# Patient Record
Sex: FEMALE | Race: WHITE | Employment: FULL TIME | URBAN - METROPOLITAN AREA
[De-identification: names, ages, dates, MRNs, and addresses within clinical notes are randomized per-mention and may not be internally consistent; named-entity substitution may affect disease eponyms.]

---

## 2019-12-09 ENCOUNTER — HOSPITAL ENCOUNTER (OUTPATIENT)
Age: 49
Discharge: HOME | End: 2019-12-11

## 2019-12-09 PROCEDURE — 86765 RUBEOLA ANTIBODY: CPT

## 2019-12-09 PROCEDURE — 86735 MUMPS ANTIBODY: CPT

## 2019-12-09 PROCEDURE — 86762 RUBELLA ANTIBODY: CPT

## 2019-12-09 PROCEDURE — 86787 VARICELLA-ZOSTER ANTIBODY: CPT

## 2019-12-10 LAB
MEASLES IMMUNE (IGG): NORMAL
MUMPS AB IGG: NORMAL
RUBELLA ANTIBODY IGG: NORMAL
VARICELLA-ZOSTER VIRUS AB, IGG: NORMAL

## 2020-12-30 ENCOUNTER — APPOINTMENT (OUTPATIENT)
Dept: GENERAL RADIOLOGY | Age: 50
End: 2020-12-30
Payer: MEDICAID

## 2020-12-30 ENCOUNTER — HOSPITAL ENCOUNTER (EMERGENCY)
Age: 50
Discharge: HOME | End: 2020-12-30
Attending: EMERGENCY MEDICINE
Payer: MEDICAID

## 2020-12-30 VITALS
HEART RATE: 86 BPM | DIASTOLIC BLOOD PRESSURE: 84 MMHG | RESPIRATION RATE: 16 BRPM | OXYGEN SATURATION: 98 % | SYSTOLIC BLOOD PRESSURE: 148 MMHG | WEIGHT: 170 LBS | BODY MASS INDEX: 26.68 KG/M2 | HEIGHT: 67 IN | TEMPERATURE: 98.3 F

## 2020-12-30 LAB
EKG ATRIAL RATE: 85 BPM
EKG P AXIS: 57 DEGREES
EKG P-R INTERVAL: 150 MS
EKG Q-T INTERVAL: 362 MS
EKG QRS DURATION: 88 MS
EKG QTC CALCULATION (BAZETT): 430 MS
EKG R AXIS: 15 DEGREES
EKG T AXIS: 15 DEGREES
EKG VENTRICULAR RATE: 85 BPM

## 2020-12-30 PROCEDURE — 99283 EMERGENCY DEPT VISIT LOW MDM: CPT

## 2020-12-30 PROCEDURE — 96372 THER/PROPH/DIAG INJ SC/IM: CPT

## 2020-12-30 PROCEDURE — 93010 ELECTROCARDIOGRAM REPORT: CPT | Performed by: INTERNAL MEDICINE

## 2020-12-30 PROCEDURE — 6360000002 HC RX W HCPCS: Performed by: EMERGENCY MEDICINE

## 2020-12-30 PROCEDURE — 73030 X-RAY EXAM OF SHOULDER: CPT

## 2020-12-30 PROCEDURE — 93005 ELECTROCARDIOGRAM TRACING: CPT | Performed by: EMERGENCY MEDICINE

## 2020-12-30 PROCEDURE — 71046 X-RAY EXAM CHEST 2 VIEWS: CPT

## 2020-12-30 RX ORDER — LISINOPRIL AND HYDROCHLOROTHIAZIDE 25; 20 MG/1; MG/1
1 TABLET ORAL DAILY
COMMUNITY

## 2020-12-30 RX ORDER — TRAMADOL HYDROCHLORIDE 50 MG/1
50 TABLET ORAL EVERY 6 HOURS PRN
Qty: 12 TABLET | Refills: 0 | Status: SHIPPED | OUTPATIENT
Start: 2020-12-30 | End: 2021-01-02

## 2020-12-30 RX ORDER — CETIRIZINE HYDROCHLORIDE 10 MG/1
10 TABLET ORAL DAILY
COMMUNITY

## 2020-12-30 RX ORDER — KETOROLAC TROMETHAMINE 30 MG/ML
60 INJECTION, SOLUTION INTRAMUSCULAR; INTRAVENOUS ONCE
Status: COMPLETED | OUTPATIENT
Start: 2020-12-30 | End: 2020-12-30

## 2020-12-30 RX ORDER — DEXAMETHASONE SODIUM PHOSPHATE 4 MG/ML
4 INJECTION, SOLUTION INTRA-ARTICULAR; INTRALESIONAL; INTRAMUSCULAR; INTRAVENOUS; SOFT TISSUE ONCE
Status: COMPLETED | OUTPATIENT
Start: 2020-12-30 | End: 2020-12-30

## 2020-12-30 RX ORDER — NAPROXEN 500 MG/1
500 TABLET ORAL 2 TIMES DAILY PRN
Qty: 60 TABLET | Refills: 0 | Status: SHIPPED | OUTPATIENT
Start: 2020-12-30

## 2020-12-30 RX ORDER — FENTANYL CITRATE 50 UG/ML
100 INJECTION, SOLUTION INTRAMUSCULAR; INTRAVENOUS ONCE
Status: COMPLETED | OUTPATIENT
Start: 2020-12-30 | End: 2020-12-30

## 2020-12-30 RX ADMIN — DEXAMETHASONE SODIUM PHOSPHATE 4 MG: 4 INJECTION, SOLUTION INTRAMUSCULAR; INTRAVENOUS at 08:28

## 2020-12-30 RX ADMIN — FENTANYL CITRATE 100 MCG: 50 INJECTION, SOLUTION INTRAMUSCULAR; INTRAVENOUS at 08:28

## 2020-12-30 RX ADMIN — KETOROLAC TROMETHAMINE 60 MG: 30 INJECTION, SOLUTION INTRAMUSCULAR at 08:28

## 2020-12-30 ASSESSMENT — PAIN DESCRIPTION - LOCATION
LOCATION: SHOULDER
LOCATION: SHOULDER

## 2020-12-30 ASSESSMENT — PAIN DESCRIPTION - FREQUENCY
FREQUENCY: CONTINUOUS
FREQUENCY: CONTINUOUS

## 2020-12-30 ASSESSMENT — PAIN DESCRIPTION - PAIN TYPE
TYPE: ACUTE PAIN
TYPE: ACUTE PAIN

## 2020-12-30 ASSESSMENT — PAIN SCALES - GENERAL
PAINLEVEL_OUTOF10: 10
PAINLEVEL_OUTOF10: 7
PAINLEVEL_OUTOF10: 10

## 2020-12-30 ASSESSMENT — PAIN DESCRIPTION - ONSET
ONSET: GRADUAL
ONSET: GRADUAL

## 2020-12-30 ASSESSMENT — PAIN DESCRIPTION - PROGRESSION
CLINICAL_PROGRESSION: GRADUALLY IMPROVING
CLINICAL_PROGRESSION: GRADUALLY WORSENING

## 2020-12-30 ASSESSMENT — PAIN DESCRIPTION - ORIENTATION
ORIENTATION: LEFT
ORIENTATION: LEFT

## 2020-12-30 ASSESSMENT — PAIN DESCRIPTION - DESCRIPTORS: DESCRIPTORS: CONSTANT;SHARP;THROBBING

## 2020-12-30 NOTE — ED PROVIDER NOTES
HPI:  12/30/20,   Time: 8:15 AM MARTHA Liu is a 45 y.o. female presenting to the ED for left shoulder pain, beginning 2 weeks ago. The complaint has been persistent, moderate in severity, and worsened by movement of left shoulder. Thinks rotator cuff, lots of repetitive over the head movements, getting worse, pcp hasn't gotten her in despite calling. No fever, no n/v/d/cp/sob/cough/congestion. No nunbness/tingling    Review of Systems:   Pertinent positives and negatives are stated within HPI, all other systems reviewed and are negative.          --------------------------------------------- PAST HISTORY ---------------------------------------------  Past Medical History:  has a past medical history of Cervical radiculopathy, Headache(784.0), Meningitis, Mononucleosis, Seizure disorder ([facility] [address] and Seizures ([facility] [address] Past Surgical History:  has a past surgical history that includes Dilation and curettage of uterus; Endometrial ablation; and Carpal tunnel release. Social History:  reports that she has been smoking. She has been smoking about 1.00 pack per day. She has never used smokeless tobacco. She reports current alcohol use of about 3.0 standard drinks of alcohol per week. She reports that she does not use drugs. Family History: family history is not on file. The patients home medications have been reviewed. Allergies: Patient has no known allergies. ---------------------------------------------------PHYSICAL EXAM--------------------------------------    Constitutional/General: Alert and oriented x3, in pain  Head: Normocephalic and atraumatic  Eyes: PERRL, EOMI, conjunctive normal, sclera non icteric  Mouth: Oropharynx clear, handling secretions,   Neck: Supple, full ROM, non tender to palpation in the midline, no stridor, no crepitus, no meningeal signs  Respiratory: Not in respiratory distress  Cardiovascular:  Regular rate. Regular rhythm. No murmurs, gallops, or rubs. 2+ distal pulses  Chest: No chest wall tenderness  GI:  Abdomen Soft, Non tender, Non distended. Musculoskeletal: limited rom left shoulder, diffuse ant ttp left shoulder. nvi distal.  strength difficult to test s due to pain lue, but does have effort with er/ir/abd/adduction of left shoulder. Warm and well perfused, no clubbing, cyanosis, or edema. Capillary refill <3 seconds  Integument: skin warm and dry. No rashes. Lymphatic: no lymphadenopathy noted  Neurologic: GCS 15, no focal deficits, symmetric strength 5/5 in the upper and lower extremities bilaterally as able to test  Psychiatric: Normal Affect    -------------------------------------------------- RESULTS -------------------------------------------------  I have personally reviewed all laboratory and imaging results for this patient. Results are listed below. LABS:  Results for orders placed or performed during the hospital encounter of 12/30/20   EKG 12 Lead   Result Value Ref Range    Ventricular Rate 85 BPM    Atrial Rate 85 BPM    P-R Interval 150 ms    QRS Duration 88 ms    Q-T Interval 362 ms    QTc Calculation (Bazett) 430 ms    P Axis 57 degrees    R Axis 15 degrees    T Axis 15 degrees       RADIOLOGY:  Interpreted by Radiologist.  XR SHOULDER LEFT (MIN 2 VIEWS)   Final Result   No acute process      XR CHEST (2 VW)   Final Result   Normal chest          EKG: This EKG is signed and interpreted by the EP. Time: 829  Rate: 85  Rhythm: Sinus  Interpretation: non-specific EKG  Comparison: None      ------------------------- NURSING NOTES AND VITALS REVIEWED ---------------------------   The nursing notes within the ED encounter and vital signs as below have been reviewed by myself.   BP (!) 190/104   Pulse 115   Temp 98.3 °F (36.8 °C) (Infrared)   Resp 18   Ht 5' 7\" (1.702 m)   Wt 170 lb (77.1 kg)   SpO2 98%   BMI 26.63 kg/m²   Oxygen Saturation Interpretation: Normal    The patients available past medical records and past encounters were reviewed. ------------------------------ ED COURSE/MEDICAL DECISION MAKING----------------------  Medications   fentaNYL (SUBLIMAZE) injection 100 mcg (100 mcg Intramuscular Given 12/30/20 0828)   ketorolac (TORADOL) injection 60 mg (60 mg Intramuscular Given 12/30/20 0828)   dexamethasone (DECADRON) injection 4 mg (4 mg Intramuscular Given 12/30/20 0828)         ED COURSE:       Medical Decision Making:    Sx msk, nvi distal, pain controlled in ed, dc with tigre lam      This patient's ED course included: a personal history and physicial examination    This patient has remained hemodynamically stable during their ED course. Re-Evaluations:             Re-evaluation. Patients symptoms are improving    Re-examination  12/30/20   9:15 AM EST                       Counseling: The emergency provider has spoken with the patient and discussed todays results, in addition to providing specific details for the plan of care and counseling regarding the diagnosis and prognosis. Questions are answered at this time and they are agreeable with the plan.       --------------------------------- IMPRESSION AND DISPOSITION ---------------------------------    IMPRESSION  1. Acute pain of left shoulder        DISPOSITION  Disposition: Discharge to home  Patient condition is stable    NOTE: This report was transcribed using voice recognition software.  Every effort was made to ensure accuracy; however, inadvertent computerized transcription errors may be present        Blaine Kanner, MD  12/30/20 [id number]

## 2020-12-30 NOTE — LETTER
[facility] Emergency Department  [id number] [address]  Phone: [phone]             December 30, 2020    Patient: Veronica Curran   YOB: 1970   Date of Visit: 12/30/2020       To Whom It May Concern:    Gregoria Garcia was seen and treated in our emergency department on 12/30/2020. She may return to work on 01/05/2021, any further excuse per pcp.       Sincerely,             Signature:__________________________________

## 2020-12-30 NOTE — ED NOTES
Pt not driving friend is. Pt states she is feeling much better than when she came in, ambulatory without any difficulty, denies any dizziness.      Anderson Bryant RN  12/30/20 8581

## 2024-02-18 ENCOUNTER — HOSPITAL ENCOUNTER (EMERGENCY)
Age: 54
Discharge: HOME | End: 2024-02-18
Attending: EMERGENCY MEDICINE
Payer: MEDICAID

## 2024-02-18 ENCOUNTER — APPOINTMENT (OUTPATIENT)
Dept: CT IMAGING | Age: 54
End: 2024-02-18
Payer: MEDICAID

## 2024-02-18 VITALS
WEIGHT: 180 LBS | DIASTOLIC BLOOD PRESSURE: 83 MMHG | RESPIRATION RATE: 16 BRPM | SYSTOLIC BLOOD PRESSURE: 124 MMHG | HEART RATE: 80 BPM | BODY MASS INDEX: 28.19 KG/M2 | OXYGEN SATURATION: 98 % | TEMPERATURE: 97.7 F

## 2024-02-18 DIAGNOSIS — H70.91 MASTOIDITIS OF RIGHT SIDE: Primary | ICD-10-CM

## 2024-02-18 DIAGNOSIS — H66.001 NON-RECURRENT ACUTE SUPPURATIVE OTITIS MEDIA OF RIGHT EAR WITHOUT SPONTANEOUS RUPTURE OF TYMPANIC MEMBRANE: ICD-10-CM

## 2024-02-18 DIAGNOSIS — H60.391 INFECTIVE OTITIS EXTERNA OF RIGHT EAR: ICD-10-CM

## 2024-02-18 DIAGNOSIS — I72.5 BASILAR ARTERY ANEURYSM (HCC): ICD-10-CM

## 2024-02-18 LAB
ANION GAP SERPL CALCULATED.3IONS-SCNC: 11 MMOL/L (ref 7–16)
BASOPHILS # BLD: 0.04 K/UL (ref 0–0.2)
BASOPHILS NFR BLD: 1 % (ref 0–2)
BUN SERPL-MCNC: 11 MG/DL (ref 6–20)
CALCIUM SERPL-MCNC: 10.3 MG/DL (ref 8.6–10.2)
CHLORIDE SERPL-SCNC: 90 MMOL/L (ref 98–107)
CO2 SERPL-SCNC: 27 MMOL/L (ref 22–29)
CREAT SERPL-MCNC: 0.7 MG/DL (ref 0.5–1)
EOSINOPHIL # BLD: 0.09 K/UL (ref 0.05–0.5)
EOSINOPHILS RELATIVE PERCENT: 1 % (ref 0–6)
ERYTHROCYTE [DISTWIDTH] IN BLOOD BY AUTOMATED COUNT: 11.4 % (ref 11.5–15)
GFR SERPL CREATININE-BSD FRML MDRD: >60 ML/MIN/1.73M2
GLUCOSE SERPL-MCNC: 103 MG/DL (ref 74–99)
HCT VFR BLD AUTO: 38.5 % (ref 34–48)
HGB BLD-MCNC: 13.8 G/DL (ref 11.5–15.5)
IMM GRANULOCYTES # BLD AUTO: <0.03 K/UL (ref 0–0.58)
IMM GRANULOCYTES NFR BLD: 0 % (ref 0–5)
LACTATE BLDV-SCNC: 1.2 MMOL/L (ref 0.5–1.9)
LYMPHOCYTES NFR BLD: 1.14 K/UL (ref 1.5–4)
LYMPHOCYTES RELATIVE PERCENT: 16 % (ref 20–42)
MCH RBC QN AUTO: 32.9 PG (ref 26–35)
MCHC RBC AUTO-ENTMCNC: 35.8 G/DL (ref 32–34.5)
MCV RBC AUTO: 91.7 FL (ref 80–99.9)
MONOCYTES NFR BLD: 0.5 K/UL (ref 0.1–0.95)
MONOCYTES NFR BLD: 7 % (ref 2–12)
NEUTROPHILS NFR BLD: 76 % (ref 43–80)
NEUTS SEG NFR BLD: 5.52 K/UL (ref 1.8–7.3)
PLATELET # BLD AUTO: 319 K/UL (ref 130–450)
PMV BLD AUTO: 8.5 FL (ref 7–12)
POTASSIUM SERPL-SCNC: 4.4 MMOL/L (ref 3.5–5)
RBC # BLD AUTO: 4.2 M/UL (ref 3.5–5.5)
SODIUM SERPL-SCNC: 128 MMOL/L (ref 132–146)
WBC OTHER # BLD: 7.3 K/UL (ref 4.5–11.5)

## 2024-02-18 PROCEDURE — 96366 THER/PROPH/DIAG IV INF ADDON: CPT

## 2024-02-18 PROCEDURE — 96375 TX/PRO/DX INJ NEW DRUG ADDON: CPT

## 2024-02-18 PROCEDURE — 96376 TX/PRO/DX INJ SAME DRUG ADON: CPT

## 2024-02-18 PROCEDURE — 80048 BASIC METABOLIC PNL TOTAL CA: CPT

## 2024-02-18 PROCEDURE — 85025 COMPLETE CBC W/AUTO DIFF WBC: CPT

## 2024-02-18 PROCEDURE — 99285 EMERGENCY DEPT VISIT HI MDM: CPT

## 2024-02-18 PROCEDURE — 96367 TX/PROPH/DG ADDL SEQ IV INF: CPT

## 2024-02-18 PROCEDURE — 6360000002 HC RX W HCPCS: Performed by: EMERGENCY MEDICINE

## 2024-02-18 PROCEDURE — 83605 ASSAY OF LACTIC ACID: CPT

## 2024-02-18 PROCEDURE — 2580000003 HC RX 258: Performed by: EMERGENCY MEDICINE

## 2024-02-18 PROCEDURE — 96365 THER/PROPH/DIAG IV INF INIT: CPT

## 2024-02-18 PROCEDURE — 70481 CT ORBIT/EAR/FOSSA W/DYE: CPT

## 2024-02-18 PROCEDURE — 87040 BLOOD CULTURE FOR BACTERIA: CPT

## 2024-02-18 PROCEDURE — 6360000004 HC RX CONTRAST MEDICATION: Performed by: RADIOLOGY

## 2024-02-18 RX ORDER — METHYLPREDNISOLONE 4 MG/1
TABLET ORAL
Qty: 1 KIT | Refills: 0 | Status: SHIPPED | OUTPATIENT
Start: 2024-02-18 | End: 2024-02-24

## 2024-02-18 RX ORDER — KETOROLAC TROMETHAMINE 30 MG/ML
30 INJECTION, SOLUTION INTRAMUSCULAR; INTRAVENOUS ONCE
Status: COMPLETED | OUTPATIENT
Start: 2024-02-18 | End: 2024-02-18

## 2024-02-18 RX ORDER — AMOXICILLIN AND CLAVULANATE POTASSIUM 875; 125 MG/1; MG/1
1 TABLET, FILM COATED ORAL 2 TIMES DAILY
Qty: 20 TABLET | Refills: 0 | Status: SHIPPED | OUTPATIENT
Start: 2024-02-18 | End: 2024-02-28

## 2024-02-18 RX ORDER — 0.9 % SODIUM CHLORIDE 0.9 %
1000 INTRAVENOUS SOLUTION INTRAVENOUS ONCE
Status: COMPLETED | OUTPATIENT
Start: 2024-02-18 | End: 2024-02-18

## 2024-02-18 RX ORDER — CIPROFLOXACIN AND DEXAMETHASONE 3; 1 MG/ML; MG/ML
3 SUSPENSION/ DROPS AURICULAR (OTIC) 3 TIMES DAILY
Qty: 7.5 ML | Refills: 0 | Status: SHIPPED | OUTPATIENT
Start: 2024-02-18 | End: 2024-02-28

## 2024-02-18 RX ORDER — HYDROCODONE BITARTRATE AND ACETAMINOPHEN 5; 325 MG/1; MG/1
1 TABLET ORAL EVERY 4 HOURS PRN
Qty: 18 TABLET | Refills: 0 | Status: SHIPPED | OUTPATIENT
Start: 2024-02-18 | End: 2024-02-21

## 2024-02-18 RX ADMIN — CEFEPIME 2000 MG: 2 INJECTION, POWDER, FOR SOLUTION INTRAVENOUS at 11:48

## 2024-02-18 RX ADMIN — KETOROLAC TROMETHAMINE 30 MG: 30 INJECTION, SOLUTION INTRAMUSCULAR; INTRAVENOUS at 13:59

## 2024-02-18 RX ADMIN — IOPAMIDOL 75 ML: 755 INJECTION, SOLUTION INTRAVENOUS at 09:03

## 2024-02-18 RX ADMIN — KETOROLAC TROMETHAMINE 30 MG: 30 INJECTION, SOLUTION INTRAMUSCULAR; INTRAVENOUS at 08:06

## 2024-02-18 RX ADMIN — SODIUM CHLORIDE 1000 ML: 9 INJECTION, SOLUTION INTRAVENOUS at 08:06

## 2024-02-18 RX ADMIN — VANCOMYCIN HYDROCHLORIDE 2000 MG: 1 INJECTION, POWDER, LYOPHILIZED, FOR SOLUTION INTRAVENOUS at 13:42

## 2024-02-18 ASSESSMENT — PAIN DESCRIPTION - LOCATION: LOCATION: EAR

## 2024-02-18 ASSESSMENT — PAIN - FUNCTIONAL ASSESSMENT
PAIN_FUNCTIONAL_ASSESSMENT: 0-10
PAIN_FUNCTIONAL_ASSESSMENT: 0-10

## 2024-02-18 ASSESSMENT — PAIN SCALES - GENERAL
PAINLEVEL_OUTOF10: 10
PAINLEVEL_OUTOF10: 7

## 2024-02-18 ASSESSMENT — LIFESTYLE VARIABLES
HOW OFTEN DO YOU HAVE A DRINK CONTAINING ALCOHOL: NEVER
HOW MANY STANDARD DRINKS CONTAINING ALCOHOL DO YOU HAVE ON A TYPICAL DAY: PATIENT DOES NOT DRINK

## 2024-02-18 ASSESSMENT — PAIN DESCRIPTION - ORIENTATION: ORIENTATION: RIGHT

## 2024-02-18 ASSESSMENT — PAIN DESCRIPTION - DESCRIPTORS: DESCRIPTORS: ACHING;SORE;DISCOMFORT

## 2024-02-18 NOTE — DISCHARGE INSTR - COC
Certification: I certify the above information and transfer of Kristine AVENDAÑO  is necessary for the continuing treatment of the diagnosis listed and that she requires {Admit to Appropriate Level of Care:99294} for {GREATER/LESS:277898171} 30 days.     Update Admission H&P: {CHP DME Changes in HandP:[id number]}    PHYSICIAN SIGNATURE:  {Esignature:[id number]}

## 2024-02-18 NOTE — ED PROVIDER NOTES
IntraVENous Given 2/18/24 0903)   ceFEPIme (MAXIPIME) 2,000 mg in sodium chloride 0.9 % 100 mL IVPB (Timz6Ahv) (0 mg IntraVENous Stopped 2/18/24 1327)   ketorolac (TORADOL) injection 30 mg (30 mg IntraVENous Given 2/18/24 1359)         Discussions with other health professionals:   ED Course as of 02/18/24 1500   Sun Feb 18, 2024   0916 The patient is reevaluated states she has had improvement in pain. [MT]   1136 After multiple discussions the patient is now agreeable to go to [facility].  She does not want to be admitted in our system [MT]   1232 Explained to patient that she is unable to go to [facility] secondary to no ENT coverage.  The patient is now much more agreeable to go to our facility.  States it was secondary to her insurance mostly covering at [facility].  She is agreeable to be admitted at [facility]. [MT]   1324 Access center on-call did attempt to connect to admitting physician.  We are connected to [email or web address].  At this time calls out for ENT [MT]   1338 Spoke with Dr. Amaro for ENT.  He states that patient can continue the Ciprodex drops 3 drops 3 times daily as well as be started on Augmentin and a Medrol Dosepak.  He states with no periosteal abscess patient can be discharged and follow with ENT.  I did discuss the patient's basilar artery aneurysm with Dr. Meza he states patient can follow-up outpatient. [MT]   1348 The patient is in the bed results of today discussed.  The patient will be discharged she will follow-up outpatient. [MT]      ED Course User Index  [MT] Monica Rowland DO         Pt will be d/c and will follow up with her PCP,  ENT and neuro interventional . She is educated on signs and symptoms that require emergent evaluation. Pt is advised to return to the ED if her symptoms change or worsen. If her pain persists, pt may need further evaluation. Pt is agreeable to plan and all questions have been answered at this time.      PATIENT REFERRED TO:  Mazin Lopez MD  [phone]

## 2024-02-19 ENCOUNTER — TELEPHONE (OUTPATIENT)
Dept: NEUROSURGERY | Age: 54
End: 2024-02-19

## 2024-02-19 NOTE — TELEPHONE ENCOUNTER
Sent a E mail per our protocol to Jeimy Flores [email or web address] to get pt scheduled for a ED follow up. [facility] 02/18 - Mastoiditis of right side-   Basilar artery aneurysm (HCC),   Non-recurrent acute suppurative otitis media of right ear without spontaneous rupture of tympanic membrane  Infective otitis externa of right ear. She has already called the ENT.    Basilar artery aneurysm (HCC)  Non-recurrent acute suppurative otitis media of right ear without spontaneous rupture of tympanic membrane  Infective otitis externa of right ear.   Email sent for Jeimy to schedule.

## 2024-02-23 LAB
MICROORGANISM SPEC CULT: NORMAL
MICROORGANISM SPEC CULT: NORMAL
SERVICE CMNT-IMP: NORMAL
SERVICE CMNT-IMP: NORMAL
SPECIMEN DESCRIPTION: NORMAL
SPECIMEN DESCRIPTION: NORMAL

## 2024-02-27 PROBLEM — I67.1 CEREBRAL ANEURYSM: Status: ACTIVE | Noted: 2024-02-27

## 2024-02-27 PROBLEM — Z71.6 ENCOUNTER FOR SMOKING CESSATION COUNSELING: Status: ACTIVE | Noted: 2024-02-27

## 2024-02-27 PROBLEM — F17.211 CIGARETTE NICOTINE DEPENDENCE IN REMISSION: Status: ACTIVE | Noted: 2024-02-27

## 2024-02-27 PROBLEM — I10 PRIMARY HYPERTENSION: Status: ACTIVE | Noted: 2024-02-27

## 2024-02-27 NOTE — H&P (VIEW-ONLY)
Neuro endovascular Surgery Consultation          Kristine Roy is a 54 y.o. Woman who is here for evaluation and management of Large basilar aneurysm    She is referred by Monica ROWLAND from the EMERGENCY ROOM to me to provide services as a Neuro endovascular specialist      Past Medical History:     Past Medical History:   Diagnosis Date    Cervical radiculopathy     Headache(784.0)     Meningitis     Mononucleosis     Seizure disorder (HCC)     Seizures (HCC)        Past Surgical History:     Past Surgical History:   Procedure Laterality Date    CARPAL TUNNEL RELEASE      bilateral sept 2020    DILATION AND CURETTAGE OF UTERUS      ENDOMETRIAL ABLATION         Allergies:     Patient has no known allergies.    Medications:     Prior to Admission medications    Medication Sig Start Date End Date Taking? Authorizing Provider   ciprofloxacin-dexAMETHasone (CIPRODEX) 0.3-0.1 % otic suspension Place 3 drops into the right ear in the morning, at noon, and at bedtime for 10 days 2/18/24 2/28/24  Monica Rowland DO   amoxicillin-clavulanate (AUGMENTIN) 875-125 MG per tablet Take 1 tablet by mouth 2 times daily for 10 days 2/18/24 2/28/24  Monica Rowland DO   VITAMIN D PO Take by mouth  Patient not taking: Reported on 2/18/2024    Patience Pugh MD   lisinopril-hydroCHLOROthiazide (PRINZIDE;ZESTORETIC) 20-25 MG per tablet Take 1 tablet by mouth daily    Patience Pugh MD   cetirizine (ZYRTEC) 10 MG tablet Take 10 mg by mouth daily    Patience Pugh MD   naproxen (NAPROSYN) 500 MG tablet Take 1 tablet by mouth 2 times daily as needed for Pain  Patient not taking: Reported on 2/18/2024 12/30/20   Miguel Zhu MD   DICLOFENAC PO Take 50 mg by mouth 2 times daily  12/30/20  Patience Pugh MD   gabapentin (NEURONTIN) 100 MG capsule Take 100 mg by mouth 3 times daily.      Patience Pugh MD       Social History:     Social History     Tobacco Use    Smoking status: Every Day     Current  Results   Component Value Date/Time     02/18/2024 08:00 AM    K 4.4 02/18/2024 08:00 AM    CL 90 02/18/2024 08:00 AM    CO2 27 02/18/2024 08:00 AM    BUN 11 02/18/2024 08:00 AM    CREATININE 0.7 02/18/2024 08:00 AM    LABGLOM >60 02/18/2024 08:00 AM    GLUCOSE 103 02/18/2024 08:00 AM    GLUCOSE 116 02/26/2012 11:15 PM    PROT 7.2 02/26/2012 11:15 PM    LABALBU 4.7 02/26/2012 11:15 PM    CALCIUM 10.3 02/18/2024 08:00 AM    BILITOT 0.2 02/26/2012 11:15 PM    ALKPHOS 41 02/26/2012 11:15 PM    AST 26 02/26/2012 11:15 PM    ALT 14 02/26/2012 11:15 PM     BMP:    Lab Results   Component Value Date/Time     02/18/2024 08:00 AM    K 4.4 02/18/2024 08:00 AM    CL 90 02/18/2024 08:00 AM    CO2 27 02/18/2024 08:00 AM    BUN 11 02/18/2024 08:00 AM    LABALBU 4.7 02/26/2012 11:15 PM    CREATININE 0.7 02/18/2024 08:00 AM    CALCIUM 10.3 02/18/2024 08:00 AM    LABGLOM >60 02/18/2024 08:00 AM    GLUCOSE 103 02/18/2024 08:00 AM    GLUCOSE 116 02/26/2012 11:15 PM     Sodium:    Lab Results   Component Value Date/Time     02/18/2024 08:00 AM     Potassium:    Lab Results   Component Value Date/Time    K 4.4 02/18/2024 08:00 AM       I independently reviewed the labs and imaging studies today.     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    Cerebral aneurysm Yes    Primary hypertension     Encounter for smoking cessation counseling     Cigarette nicotine dependence in remission            Plan:     We discussed the need to stop smoking immediately. I enforced that smoking is highly associated with atherosclerotic disease associated with heart attacks, strokes, and peripheral vascular disease. It is also associated with the formation and regrowth of intracranial aneurysms. I encouraged the patient to seek the help of his PCP

## 2024-03-01 ENCOUNTER — TELEPHONE (OUTPATIENT)
Dept: CASE MANAGEMENT | Age: 54
End: 2024-03-01

## 2024-03-01 NOTE — TELEPHONE ENCOUNTER
I called the patient's insurance [employer] and I spoke with Elliot MCPHERSON authorization rep., she stated no authorization is required for CPT code 17995.   I notified Enrique MCKEON ([employer]).        Electronically signed by Jose Amor on 3/1/24 at 8:46 AM EST

## 2024-03-13 ENCOUNTER — ANESTHESIA EVENT (OUTPATIENT)
Dept: INTERVENTIONAL RADIOLOGY/VASCULAR | Age: 54
End: 2024-03-13
Payer: MEDICAID

## 2024-03-13 ENCOUNTER — HOSPITAL ENCOUNTER (INPATIENT)
Dept: INTERVENTIONAL RADIOLOGY/VASCULAR | Age: 54
LOS: 1 days | Discharge: HOME | DRG: 030 | End: 2024-03-14
Attending: PSYCHIATRY & NEUROLOGY | Admitting: PSYCHIATRY & NEUROLOGY
Payer: MEDICAID

## 2024-03-13 ENCOUNTER — ANESTHESIA (OUTPATIENT)
Dept: INTERVENTIONAL RADIOLOGY/VASCULAR | Age: 54
End: 2024-03-13
Payer: MEDICAID

## 2024-03-13 DIAGNOSIS — I67.1 CEREBRAL ANEURYSM: ICD-10-CM

## 2024-03-13 LAB
ACTIVATED CLOTTING TIME, LOW RANGE: 136 SEC
ACTIVATED CLOTTING TIME, LOW RANGE: 162 SEC
ACTIVATED CLOTTING TIME, LOW RANGE: 203 SEC
ACTIVATED CLOTTING TIME, LOW RANGE: 214 SEC
ALBUMIN SERPL-MCNC: 4.3 G/DL (ref 3.5–5.2)
ALP SERPL-CCNC: 65 U/L (ref 35–104)
ALT SERPL-CCNC: 7 U/L (ref 0–32)
ANION GAP SERPL CALCULATED.3IONS-SCNC: 11 MMOL/L (ref 7–16)
AST SERPL-CCNC: 16 U/L (ref 0–31)
BASOPHILS # BLD: 0.02 K/UL (ref 0–0.2)
BASOPHILS NFR BLD: 1 % (ref 0–2)
BILIRUB SERPL-MCNC: 0.2 MG/DL (ref 0–1.2)
BUN SERPL-MCNC: 10 MG/DL (ref 6–20)
CALCIUM SERPL-MCNC: 9.9 MG/DL (ref 8.6–10.2)
CHLORIDE SERPL-SCNC: 93 MMOL/L (ref 98–107)
CLOSURE TME COLL+ADP BLD: 134 SEC (ref 48–103)
CO2 SERPL-SCNC: 26 MMOL/L (ref 22–29)
COLLAGEN EPINEPHRINE TIME: 181 SEC (ref 83–176)
CREAT SERPL-MCNC: 0.7 MG/DL (ref 0.5–1)
EOSINOPHIL # BLD: 0.19 K/UL (ref 0.05–0.5)
EOSINOPHILS RELATIVE PERCENT: 4 % (ref 0–6)
ERYTHROCYTE [DISTWIDTH] IN BLOOD BY AUTOMATED COUNT: 11.3 % (ref 11.5–15)
GFR SERPL CREATININE-BSD FRML MDRD: >60 ML/MIN/1.73M2
GLUCOSE SERPL-MCNC: 74 MG/DL (ref 74–99)
HCT VFR BLD AUTO: 36.4 % (ref 34–48)
HGB BLD-MCNC: 12.7 G/DL (ref 11.5–15.5)
IMM GRANULOCYTES # BLD AUTO: <0.03 K/UL (ref 0–0.58)
IMM GRANULOCYTES NFR BLD: 0 % (ref 0–5)
INR PPP: 1
LYMPHOCYTES NFR BLD: 1.35 K/UL (ref 1.5–4)
LYMPHOCYTES RELATIVE PERCENT: 31 % (ref 20–42)
MCH RBC QN AUTO: 32.6 PG (ref 26–35)
MCHC RBC AUTO-ENTMCNC: 34.9 G/DL (ref 32–34.5)
MCV RBC AUTO: 93.6 FL (ref 80–99.9)
MONOCYTES NFR BLD: 0.36 K/UL (ref 0.1–0.95)
MONOCYTES NFR BLD: 8 % (ref 2–12)
NEUTROPHILS NFR BLD: 56 % (ref 43–80)
NEUTS SEG NFR BLD: 2.46 K/UL (ref 1.8–7.3)
PLATELET # BLD AUTO: 316 K/UL (ref 130–450)
PMV BLD AUTO: 8.6 FL (ref 7–12)
POTASSIUM SERPL-SCNC: 4.1 MMOL/L (ref 3.5–5)
PROT SERPL-MCNC: 7.8 G/DL (ref 6.4–8.3)
PROTHROMBIN TIME: 10.9 SEC (ref 9.3–12.4)
RBC # BLD AUTO: 3.89 M/UL (ref 3.5–5.5)
SODIUM SERPL-SCNC: 130 MMOL/L (ref 132–146)
WBC OTHER # BLD: 4.4 K/UL (ref 4.5–11.5)

## 2024-03-13 PROCEDURE — C1889 IMPLANT/INSERT DEVICE, NOC: HCPCS

## 2024-03-13 PROCEDURE — 6360000002 HC RX W HCPCS: Performed by: PSYCHIATRY & NEUROLOGY

## 2024-03-13 PROCEDURE — 2709999900 IR TRANSCATH EMBOLIZATION

## 2024-03-13 PROCEDURE — 6360000002 HC RX W HCPCS: Performed by: NURSE ANESTHETIST, CERTIFIED REGISTERED

## 2024-03-13 PROCEDURE — 03LG3DZ OCCLUSION OF INTRACRANIAL ARTERY WITH INTRALUMINAL DEVICE, PERCUTANEOUS APPROACH: ICD-10-PCS | Performed by: PSYCHIATRY & NEUROLOGY

## 2024-03-13 PROCEDURE — B3171ZZ FLUOROSCOPY OF LEFT INTERNAL CAROTID ARTERY USING LOW OSMOLAR CONTRAST: ICD-10-PCS | Performed by: PSYCHIATRY & NEUROLOGY

## 2024-03-13 PROCEDURE — 2500000003 HC RX 250 WO HCPCS: Performed by: PSYCHIATRY & NEUROLOGY

## 2024-03-13 PROCEDURE — 03HY32Z INSERTION OF MONITORING DEVICE INTO UPPER ARTERY, PERCUTANEOUS APPROACH: ICD-10-PCS | Performed by: PSYCHIATRY & NEUROLOGY

## 2024-03-13 PROCEDURE — 2580000003 HC RX 258: Performed by: NURSE ANESTHETIST, CERTIFIED REGISTERED

## 2024-03-13 PROCEDURE — 61624 TCAT PERM OCCLS/EMBOLJ CNS: CPT | Performed by: PSYCHIATRY & NEUROLOGY

## 2024-03-13 PROCEDURE — 85347 COAGULATION TIME ACTIVATED: CPT

## 2024-03-13 PROCEDURE — 2580000003 HC RX 258: Performed by: PSYCHIATRY & NEUROLOGY

## 2024-03-13 PROCEDURE — 36228 PLACE CATH INTRACRANIAL ART: CPT | Performed by: PSYCHIATRY & NEUROLOGY

## 2024-03-13 PROCEDURE — 36226 PLACE CATH VERTEBRAL ART: CPT | Performed by: PSYCHIATRY & NEUROLOGY

## 2024-03-13 PROCEDURE — 6370000000 HC RX 637 (ALT 250 FOR IP): Performed by: PSYCHIATRY & NEUROLOGY

## 2024-03-13 PROCEDURE — 75898 FOLLOW-UP ANGIOGRAPHY: CPT

## 2024-03-13 PROCEDURE — 85025 COMPLETE CBC W/AUTO DIFF WBC: CPT

## 2024-03-13 PROCEDURE — 36226 PLACE CATH VERTEBRAL ART: CPT

## 2024-03-13 PROCEDURE — 2000000000 HC ICU R&B

## 2024-03-13 PROCEDURE — 85576 BLOOD PLATELET AGGREGATION: CPT

## 2024-03-13 PROCEDURE — 3700000001 HC ADD 15 MINUTES (ANESTHESIA)

## 2024-03-13 PROCEDURE — 75894 X-RAYS TRANSCATH THERAPY: CPT

## 2024-03-13 PROCEDURE — 61626 TCAT PERM OCCLS/EMBOL NONCNS: CPT

## 2024-03-13 PROCEDURE — 80053 COMPREHEN METABOLIC PANEL: CPT

## 2024-03-13 PROCEDURE — 6360000004 HC RX CONTRAST MEDICATION: Performed by: PSYCHIATRY & NEUROLOGY

## 2024-03-13 PROCEDURE — 75898 FOLLOW-UP ANGIOGRAPHY: CPT | Performed by: PSYCHIATRY & NEUROLOGY

## 2024-03-13 PROCEDURE — 85610 PROTHROMBIN TIME: CPT

## 2024-03-13 PROCEDURE — 2500000003 HC RX 250 WO HCPCS: Performed by: NURSE ANESTHETIST, CERTIFIED REGISTERED

## 2024-03-13 PROCEDURE — 36228 PLACE CATH INTRACRANIAL ART: CPT

## 2024-03-13 PROCEDURE — 3700000000 HC ANESTHESIA ATTENDED CARE

## 2024-03-13 PROCEDURE — 75894 X-RAYS TRANSCATH THERAPY: CPT | Performed by: PSYCHIATRY & NEUROLOGY

## 2024-03-13 RX ORDER — ZOLPIDEM TARTRATE 5 MG/1
5 TABLET ORAL NIGHTLY PRN
Status: COMPLETED | OUTPATIENT
Start: 2024-03-13 | End: 2024-03-13

## 2024-03-13 RX ORDER — FOLIC ACID 1 MG/1
1000 TABLET ORAL DAILY
Status: DISCONTINUED | OUTPATIENT
Start: 2024-03-13 | End: 2024-03-14 | Stop reason: HOSPADM

## 2024-03-13 RX ORDER — ATORVASTATIN CALCIUM 20 MG/1
20 TABLET, FILM COATED ORAL NIGHTLY
Status: DISCONTINUED | OUTPATIENT
Start: 2024-03-13 | End: 2024-03-14 | Stop reason: HOSPADM

## 2024-03-13 RX ORDER — LISINOPRIL AND HYDROCHLOROTHIAZIDE 25; 20 MG/1; MG/1
1 TABLET ORAL DAILY
Status: DISCONTINUED | OUTPATIENT
Start: 2024-03-13 | End: 2024-03-13 | Stop reason: SDUPTHER

## 2024-03-13 RX ORDER — SODIUM CHLORIDE 0.9 % (FLUSH) 0.9 %
5-40 SYRINGE (ML) INJECTION PRN
Status: DISCONTINUED | OUTPATIENT
Start: 2024-03-13 | End: 2024-03-14 | Stop reason: HOSPADM

## 2024-03-13 RX ORDER — ONDANSETRON 2 MG/ML
4 INJECTION INTRAMUSCULAR; INTRAVENOUS EVERY 6 HOURS PRN
Status: DISCONTINUED | OUTPATIENT
Start: 2024-03-13 | End: 2024-03-14 | Stop reason: HOSPADM

## 2024-03-13 RX ORDER — OXYCODONE HYDROCHLORIDE 5 MG/1
5 TABLET ORAL EVERY 4 HOURS PRN
Status: COMPLETED | OUTPATIENT
Start: 2024-03-13 | End: 2024-03-13

## 2024-03-13 RX ORDER — DEXAMETHASONE SODIUM PHOSPHATE 10 MG/ML
4 INJECTION INTRAMUSCULAR; INTRAVENOUS EVERY 6 HOURS PRN
Status: DISCONTINUED | OUTPATIENT
Start: 2024-03-13 | End: 2024-03-14 | Stop reason: HOSPADM

## 2024-03-13 RX ORDER — ENOXAPARIN SODIUM 100 MG/ML
40 INJECTION SUBCUTANEOUS DAILY
Status: DISCONTINUED | OUTPATIENT
Start: 2024-03-13 | End: 2024-03-14 | Stop reason: HOSPADM

## 2024-03-13 RX ORDER — MELOXICAM 7.5 MG/1
15 TABLET ORAL DAILY
Status: DISCONTINUED | OUTPATIENT
Start: 2024-03-13 | End: 2024-03-14 | Stop reason: HOSPADM

## 2024-03-13 RX ORDER — SODIUM CHLORIDE 9 MG/ML
INJECTION, SOLUTION INTRAVENOUS CONTINUOUS PRN
Status: DISCONTINUED | OUTPATIENT
Start: 2024-03-13 | End: 2024-03-13 | Stop reason: SDUPTHER

## 2024-03-13 RX ORDER — LABETALOL HYDROCHLORIDE 5 MG/ML
5 INJECTION, SOLUTION INTRAVENOUS EVERY 10 MIN PRN
Status: DISCONTINUED | OUTPATIENT
Start: 2024-03-13 | End: 2024-03-14 | Stop reason: HOSPADM

## 2024-03-13 RX ORDER — ASPIRIN 81 MG/1
81 TABLET, CHEWABLE ORAL DAILY
Status: DISCONTINUED | OUTPATIENT
Start: 2024-03-13 | End: 2024-03-14 | Stop reason: HOSPADM

## 2024-03-13 RX ORDER — SODIUM CHLORIDE 9 MG/ML
INJECTION, SOLUTION INTRAVENOUS PRN
Status: DISCONTINUED | OUTPATIENT
Start: 2024-03-13 | End: 2024-03-14 | Stop reason: HOSPADM

## 2024-03-13 RX ORDER — HEPARIN SODIUM 1000 [USP'U]/ML
INJECTION, SOLUTION INTRAVENOUS; SUBCUTANEOUS PRN
Status: DISCONTINUED | OUTPATIENT
Start: 2024-03-13 | End: 2024-03-13 | Stop reason: SDUPTHER

## 2024-03-13 RX ORDER — HYDROCHLOROTHIAZIDE 25 MG/1
12.5 TABLET ORAL DAILY
Status: DISCONTINUED | OUTPATIENT
Start: 2024-03-13 | End: 2024-03-14 | Stop reason: HOSPADM

## 2024-03-13 RX ORDER — ONDANSETRON 2 MG/ML
INJECTION INTRAMUSCULAR; INTRAVENOUS PRN
Status: DISCONTINUED | OUTPATIENT
Start: 2024-03-13 | End: 2024-03-13 | Stop reason: SDUPTHER

## 2024-03-13 RX ORDER — CLINDAMYCIN PHOSPHATE 150 MG/ML
INJECTION, SOLUTION INTRAVENOUS PRN
Status: DISCONTINUED | OUTPATIENT
Start: 2024-03-13 | End: 2024-03-13 | Stop reason: SDUPTHER

## 2024-03-13 RX ORDER — ASPIRIN 81 MG/1
81 TABLET, CHEWABLE ORAL DAILY
Status: DISCONTINUED | OUTPATIENT
Start: 2024-03-14 | End: 2024-03-13

## 2024-03-13 RX ORDER — ONDANSETRON 4 MG/1
4 TABLET, ORALLY DISINTEGRATING ORAL EVERY 8 HOURS PRN
Status: DISCONTINUED | OUTPATIENT
Start: 2024-03-13 | End: 2024-03-14 | Stop reason: HOSPADM

## 2024-03-13 RX ORDER — NICOTINE 21 MG/24HR
1 PATCH, TRANSDERMAL 24 HOURS TRANSDERMAL DAILY
Status: DISCONTINUED | OUTPATIENT
Start: 2024-03-13 | End: 2024-03-14 | Stop reason: HOSPADM

## 2024-03-13 RX ORDER — MIDAZOLAM HYDROCHLORIDE 1 MG/ML
INJECTION INTRAMUSCULAR; INTRAVENOUS PRN
Status: DISCONTINUED | OUTPATIENT
Start: 2024-03-13 | End: 2024-03-13 | Stop reason: SDUPTHER

## 2024-03-13 RX ORDER — LISINOPRIL 20 MG/1
20 TABLET ORAL DAILY
Status: DISCONTINUED | OUTPATIENT
Start: 2024-03-13 | End: 2024-03-14 | Stop reason: HOSPADM

## 2024-03-13 RX ORDER — FENTANYL CITRATE 50 UG/ML
INJECTION, SOLUTION INTRAMUSCULAR; INTRAVENOUS PRN
Status: DISCONTINUED | OUTPATIENT
Start: 2024-03-13 | End: 2024-03-13 | Stop reason: SDUPTHER

## 2024-03-13 RX ORDER — ACETAMINOPHEN 325 MG/1
650 TABLET ORAL EVERY 4 HOURS PRN
Status: DISCONTINUED | OUTPATIENT
Start: 2024-03-13 | End: 2024-03-13

## 2024-03-13 RX ORDER — HEPARIN SODIUM 10000 [USP'U]/ML
INJECTION, SOLUTION INTRAVENOUS; SUBCUTANEOUS PRN
Status: COMPLETED | OUTPATIENT
Start: 2024-03-13 | End: 2024-03-13

## 2024-03-13 RX ORDER — MIDAZOLAM HYDROCHLORIDE 2 MG/2ML
1 INJECTION, SOLUTION INTRAMUSCULAR; INTRAVENOUS ONCE
Status: COMPLETED | OUTPATIENT
Start: 2024-03-13 | End: 2024-03-13

## 2024-03-13 RX ORDER — LIDOCAINE HYDROCHLORIDE 20 MG/ML
INJECTION, SOLUTION INFILTRATION; PERINEURAL PRN
Status: COMPLETED | OUTPATIENT
Start: 2024-03-13 | End: 2024-03-13

## 2024-03-13 RX ORDER — DEXAMETHASONE SODIUM PHOSPHATE 10 MG/ML
INJECTION, EMULSION INTRAMUSCULAR; INTRAVENOUS PRN
Status: DISCONTINUED | OUTPATIENT
Start: 2024-03-13 | End: 2024-03-13 | Stop reason: SDUPTHER

## 2024-03-13 RX ORDER — HYDRALAZINE HYDROCHLORIDE 20 MG/ML
5 INJECTION INTRAMUSCULAR; INTRAVENOUS EVERY 10 MIN PRN
Status: DISCONTINUED | OUTPATIENT
Start: 2024-03-13 | End: 2024-03-14 | Stop reason: HOSPADM

## 2024-03-13 RX ORDER — PROPOFOL 10 MG/ML
INJECTION, EMULSION INTRAVENOUS PRN
Status: DISCONTINUED | OUTPATIENT
Start: 2024-03-13 | End: 2024-03-13 | Stop reason: SDUPTHER

## 2024-03-13 RX ORDER — ROCURONIUM BROMIDE 10 MG/ML
INJECTION, SOLUTION INTRAVENOUS PRN
Status: DISCONTINUED | OUTPATIENT
Start: 2024-03-13 | End: 2024-03-13 | Stop reason: SDUPTHER

## 2024-03-13 RX ORDER — SODIUM CHLORIDE 0.9 % (FLUSH) 0.9 %
5-40 SYRINGE (ML) INJECTION EVERY 12 HOURS SCHEDULED
Status: DISCONTINUED | OUTPATIENT
Start: 2024-03-13 | End: 2024-03-14 | Stop reason: HOSPADM

## 2024-03-13 RX ORDER — ASPIRIN 81 MG/1
81 TABLET ORAL DAILY
COMMUNITY

## 2024-03-13 RX ORDER — ACETAMINOPHEN 325 MG/1
650 TABLET ORAL EVERY 4 HOURS PRN
Status: DISCONTINUED | OUTPATIENT
Start: 2024-03-13 | End: 2024-03-14 | Stop reason: HOSPADM

## 2024-03-13 RX ADMIN — HEPARIN SODIUM 500 UNITS: 1000 INJECTION INTRAVENOUS; SUBCUTANEOUS at 09:53

## 2024-03-13 RX ADMIN — LISINOPRIL 20 MG: 20 TABLET ORAL at 11:42

## 2024-03-13 RX ADMIN — ONDANSETRON HYDROCHLORIDE 4 MG: 2 SOLUTION INTRAMUSCULAR; INTRAVENOUS at 10:22

## 2024-03-13 RX ADMIN — LABETALOL HYDROCHLORIDE 5 MG: 5 INJECTION INTRAVENOUS at 13:52

## 2024-03-13 RX ADMIN — Medication 1000 ML: at 09:32

## 2024-03-13 RX ADMIN — FENTANYL CITRATE 50 MCG: 50 INJECTION, SOLUTION INTRAMUSCULAR; INTRAVENOUS at 10:02

## 2024-03-13 RX ADMIN — FOLIC ACID 1000 MCG: 1 TABLET ORAL at 11:41

## 2024-03-13 RX ADMIN — MIDAZOLAM 1 MG: 1 INJECTION INTRAMUSCULAR; INTRAVENOUS at 08:31

## 2024-03-13 RX ADMIN — OXYCODONE 5 MG: 5 TABLET ORAL at 16:18

## 2024-03-13 RX ADMIN — HYDROCHLOROTHIAZIDE 12.5 MG: 25 TABLET ORAL at 11:42

## 2024-03-13 RX ADMIN — OXYCODONE 5 MG: 5 TABLET ORAL at 11:42

## 2024-03-13 RX ADMIN — SODIUM CHLORIDE: 9 INJECTION, SOLUTION INTRAVENOUS at 09:04

## 2024-03-13 RX ADMIN — Medication 5000 UNITS: at 09:31

## 2024-03-13 RX ADMIN — ASPIRIN 81 MG: 81 TABLET, CHEWABLE ORAL at 14:43

## 2024-03-13 RX ADMIN — IOPAMIDOL 80 ML: 612 INJECTION, SOLUTION INTRAVENOUS at 13:21

## 2024-03-13 RX ADMIN — LABETALOL HYDROCHLORIDE 5 MG: 5 INJECTION INTRAVENOUS at 22:29

## 2024-03-13 RX ADMIN — CLINDAMYCIN PHOSPHATE 900 MG: 150 INJECTION, SOLUTION INTRAVENOUS at 09:23

## 2024-03-13 RX ADMIN — DEXAMETHASONE SODIUM PHOSPHATE 10 MG: 10 INJECTION, EMULSION INTRAMUSCULAR; INTRAVENOUS at 09:39

## 2024-03-13 RX ADMIN — ROCURONIUM BROMIDE 40 MG: 10 INJECTION, SOLUTION INTRAVENOUS at 09:06

## 2024-03-13 RX ADMIN — ATORVASTATIN CALCIUM 20 MG: 20 TABLET, FILM COATED ORAL at 20:31

## 2024-03-13 RX ADMIN — OXYCODONE 5 MG: 5 TABLET ORAL at 20:31

## 2024-03-13 RX ADMIN — SUGAMMADEX 200 MG: 100 INJECTION, SOLUTION INTRAVENOUS at 10:23

## 2024-03-13 RX ADMIN — MELOXICAM 15 MG: 7.5 TABLET ORAL at 12:13

## 2024-03-13 RX ADMIN — FENTANYL CITRATE 100 MCG: 50 INJECTION, SOLUTION INTRAMUSCULAR; INTRAVENOUS at 09:06

## 2024-03-13 RX ADMIN — ACETAMINOPHEN 650 MG: 325 TABLET ORAL at 11:42

## 2024-03-13 RX ADMIN — ROCURONIUM BROMIDE 10 MG: 10 INJECTION, SOLUTION INTRAVENOUS at 09:40

## 2024-03-13 RX ADMIN — LIDOCAINE HYDROCHLORIDE 7 ML: 20 INJECTION, SOLUTION INFILTRATION; PERINEURAL at 10:18

## 2024-03-13 RX ADMIN — ZOLPIDEM TARTRATE 5 MG: 5 TABLET ORAL at 20:31

## 2024-03-13 RX ADMIN — ACETAMINOPHEN 650 MG: 325 TABLET ORAL at 20:31

## 2024-03-13 RX ADMIN — MIDAZOLAM 2 MG: 1 INJECTION INTRAMUSCULAR; INTRAVENOUS at 09:03

## 2024-03-13 RX ADMIN — PROPOFOL 200 MG: 10 INJECTION, EMULSION INTRAVENOUS at 09:06

## 2024-03-13 RX ADMIN — DEXAMETHASONE SODIUM PHOSPHATE 4 MG: 10 INJECTION INTRAMUSCULAR; INTRAVENOUS at 12:13

## 2024-03-13 RX ADMIN — SODIUM CHLORIDE, PRESERVATIVE FREE 10 ML: 5 INJECTION INTRAVENOUS at 20:33

## 2024-03-13 RX ADMIN — ENOXAPARIN SODIUM 40 MG: 100 INJECTION SUBCUTANEOUS at 14:43

## 2024-03-13 RX ADMIN — HEPARIN SODIUM 2000 UNITS: 1000 INJECTION INTRAVENOUS; SUBCUTANEOUS at 09:39

## 2024-03-13 RX ADMIN — SODIUM CHLORIDE: 9 INJECTION, SOLUTION INTRAVENOUS at 09:01

## 2024-03-13 RX ADMIN — SODIUM CHLORIDE, PRESERVATIVE FREE 10 ML: 5 INJECTION INTRAVENOUS at 14:47

## 2024-03-13 RX ADMIN — HEPARIN SODIUM 2000 UNITS: 1000 INJECTION INTRAVENOUS; SUBCUTANEOUS at 09:33

## 2024-03-13 ASSESSMENT — PAIN DESCRIPTION - ONSET
ONSET: ON-GOING

## 2024-03-13 ASSESSMENT — PAIN DESCRIPTION - LOCATION
LOCATION: HEAD

## 2024-03-13 ASSESSMENT — PAIN - FUNCTIONAL ASSESSMENT
PAIN_FUNCTIONAL_ASSESSMENT: NONE - DENIES PAIN
PAIN_FUNCTIONAL_ASSESSMENT: ACTIVITIES ARE NOT PREVENTED

## 2024-03-13 ASSESSMENT — PAIN SCALES - GENERAL
PAINLEVEL_OUTOF10: 7
PAINLEVEL_OUTOF10: 0
PAINLEVEL_OUTOF10: 0
PAINLEVEL_OUTOF10: 6
PAINLEVEL_OUTOF10: 4
PAINLEVEL_OUTOF10: 0
PAINLEVEL_OUTOF10: 4
PAINLEVEL_OUTOF10: 7

## 2024-03-13 ASSESSMENT — PAIN DESCRIPTION - DESCRIPTORS: DESCRIPTORS: ACHING;DISCOMFORT;POUNDING;PRESSURE

## 2024-03-13 ASSESSMENT — PAIN DESCRIPTION - ORIENTATION
ORIENTATION: POSTERIOR
ORIENTATION: RIGHT

## 2024-03-13 ASSESSMENT — PAIN DESCRIPTION - PAIN TYPE
TYPE: ACUTE PAIN

## 2024-03-13 ASSESSMENT — PAIN DESCRIPTION - FREQUENCY
FREQUENCY: INTERMITTENT

## 2024-03-13 NOTE — CARE COORDINATION
Spoke with patient. She is from home, her significant other lives with her. 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 She is completely independent, does not use assistive devices. PCP is Dr. Lopez. Her daughter is in town from [place], will likely transport home at discharge. She plans to return home with no needs. Currently on 2L oxygen, no home oxygen. Plan is home with no needs when released.     Case Management Assessment  Initial Evaluation    Date/Time of Evaluation: 3/13/2024 2:38 PM  Assessment Completed by: IDALIA Le    If patient is discharged prior to next notation, then this note serves as note for discharge by case management.    Patient Name: Kristine Roy                   YOB: 1970  Diagnosis: Cerebral aneurysm, nonruptured [I67.1]  Cerebral aneurysm without rupture [I67.1]                   Date / Time: 3/13/2024 10:51 AM    Patient Admission Status: Inpatient   Readmission Risk (Low < 19, Mod (19-27), High > 27): Readmission Risk Score: 4.2    Current PCP: Mazin Lopez MD  PCP verified by CM? Yes    Chart Reviewed: Yes      History Provided by: Patient  Patient Orientation: Alert and Oriented    Patient Cognition: Alert    Hospitalization in the last 30 days (Readmission):  No    If yes, Readmission Assessment in CM Navigator will be completed.    Advance Directives:      Code Status: Full Code   Patient's Primary Decision Maker is: Legal Next of Kin      Discharge Planning:    Patient lives with: Spouse/Significant Other Type of Home: House  Primary Care Giver: Self  Patient Support Systems include: Family Members, Children, Spouse/Significant Other   Current Financial resources:    Current community resources:    Current services prior to admission: None            Current DME:              Type of Home Care services:  None    ADLS  Prior functional level: Independent in ADLs/IADLs  Current functional level: Independent in ADLs/IADLs    PT AM-PAC:   /24  OT

## 2024-03-13 NOTE — ANESTHESIA PRE PROCEDURE
Department of Anesthesiology  Preprocedure Note       Name:  Kristine Roy   Age:  54 y.o.  :  1970                                          MRN:  [id number]         Date:  3/13/2024      Surgeon: Jama    Procedure: cerebral coiling    Medications prior to admission:   Prior to Admission medications    Medication Sig Start Date End Date Taking? Authorizing Provider   aspirin 81 MG EC tablet Take 1 tablet by mouth daily   Yes Patience Pugh MD   folic acid (FOLVITE) 1 MG tablet Take 1 tablet by mouth daily    Patience Pugh MD   meloxicam (MOBIC) 15 MG tablet Take 1 tablet by mouth daily 2/15/24   Patience Pugh MD   phentermine 15 MG capsule Take 1 capsule by mouth every morning.    Patience Pugh MD   VITAMIN D PO Take by mouth  Patient not taking: Reported on 2024    Patience Pugh MD   lisinopril-hydroCHLOROthiazide (PRINZIDE;ZESTORETIC) 20-25 MG per tablet Take 1 tablet by mouth daily    Patience Pugh MD   cetirizine (ZYRTEC) 10 MG tablet Take 10 mg by mouth daily  Patient not taking: Reported on 2024    Patience Pugh MD   naproxen (NAPROSYN) 500 MG tablet Take 1 tablet by mouth 2 times daily as needed for Pain  Patient not taking: Reported on 20   Miguel Zhu MD   DICLOFENAC PO Take 50 mg by mouth 2 times daily  20  Patience Pugh MD   gabapentin (NEURONTIN) 100 MG capsule Take 100 mg by mouth 3 times daily.    Patient not taking: Reported on 2024    Patience Pugh MD       Current medications:    Current Outpatient Medications   Medication Sig Dispense Refill    aspirin 81 MG EC tablet Take 1 tablet by mouth daily      folic acid (FOLVITE) 1 MG tablet Take 1 tablet by mouth daily      meloxicam (MOBIC) 15 MG tablet Take 1 tablet by mouth daily      phentermine 15 MG capsule Take 1 capsule by mouth every morning.      VITAMIN D PO Take by mouth (Patient not taking: Reported on 2024)

## 2024-03-13 NOTE — ANESTHESIA PROCEDURE NOTES
Arterial Line:    An arterial line was placed using surface landmarks, in the OR for the following indication(s): .    A 20 gauge (size), 1 and 3/4 inch (length), Arrow (type) catheter was placed, Seldinger technique used, into the left radial artery, secured by tape and Tegaderm.  Anesthesia type: Local  Local infiltration: Injection    Events:  patient tolerated procedure well with no complications and EBL < 5mL.3/13/2024 9:05 AM3/13/2024 9:15 AM  Anesthesiologist: Macy Aceves MD  Preanesthetic Checklist  Completed: patient identified, IV checked, site marked, risks and benefits discussed, surgical/procedural consents, equipment checked, pre-op evaluation, timeout performed, anesthesia consent given, oxygen available, monitors applied/VS acknowledged, fire risk safety assessment completed and verbalized and blood product R/B/A discussed and consented

## 2024-03-13 NOTE — ANESTHESIA PROCEDURE NOTES
Arterial Line:    An arterial line was placed using surface landmarks, in the OR for the following indication(s): continuous blood pressure monitoring and blood sampling needed.    A 20 gauge (size), 10 cm (length), Angiocath (type) catheter was placed, Seldinger technique used, into the left radial artery, secured by tape and Tegaderm.    Events:  patient tolerated procedure well with no complications.3/13/2024 9:07 AM3/13/2024 9:09 AM  Anesthesiologist: Macy Aceves MD  Performed: Anesthesiologist   Preanesthetic Checklist  Completed: patient identified and IV checked

## 2024-03-13 NOTE — ANESTHESIA POSTPROCEDURE EVALUATION
Department of Anesthesiology  Postprocedure Note    Patient: Kristine Roy  MRN: [id number]  YOB: 1970  Date of evaluation: 3/13/2024    Procedure Summary       Date: 03/13/24 Room / Location: [facility] Special Procedures; [facility] Radiology    Anesthesia Start: 0902 Anesthesia Stop: 1040    Procedures:       IR OCCLUSIVE OR EMBOL PERC CENTL NERV SYS      IR TRANSCATH EMBOLIZATION Diagnosis:       Cerebral aneurysm      Cerebral aneurysm, nonruptured      Cerebral aneurysm without rupture    Scheduled Providers: Avi Varela MD Responsible Provider: Macy Aceves MD    Anesthesia Type: general ASA Status: 3            Anesthesia Type: No value filed.    Liam Phase I: Liam Score: 10    Liam Phase II: Liam Score: 10    Anesthesia Post Evaluation    Patient location during evaluation: PACU  Patient participation: complete - patient participated  Level of consciousness: awake  Airway patency: patent  Nausea & Vomiting: no nausea and no vomiting  Cardiovascular status: hemodynamically stable  Respiratory status: acceptable  Hydration status: euvolemic  Pain management: adequate    No notable events documented.

## 2024-03-13 NOTE — PLAN OF CARE
Problem: Discharge Planning  Goal: Discharge to home or other facility with appropriate resources  Flowsheets (Taken 3/13/2024 1402)  Discharge to home or other facility with appropriate resources: Identify discharge learning needs (meds, wound care, etc)     Problem: ABCDS Injury Assessment  Goal: Absence of physical injury  Flowsheets (Taken 3/13/2024 1402)  Absence of Physical Injury: Implement safety measures based on patient assessment     Problem: Safety - Adult  Goal: Free from fall injury  Flowsheets (Taken 3/13/2024 1402)  Free From Fall Injury: Instruct family/caregiver on patient safety     Problem: Pain  Goal: Verbalizes/displays adequate comfort level or baseline comfort level  Flowsheets (Taken 3/13/2024 1402)  Verbalizes/displays adequate comfort level or baseline comfort level:   Assess pain using appropriate pain scale   Encourage patient to monitor pain and request assistance

## 2024-03-13 NOTE — PROGRESS NOTES
Patient arrived with family to Radiology department for Cerebral angiogram with coiling. Allergies, home medications, H&P and fasting instructions reviewed with patient. Vital signs taken. IV placed, blood obtained, IV flushed and prn adapter attached. Blood sample sent to lab for ordered tests.  Procedural instructions given, questions answered, understanding expressed and consent signed. Patient given fluoroscopy education, no questions at this time.

## 2024-03-13 NOTE — BRIEF OP NOTE
Brief Postoperative Note      Patient: Kristine Roy  YOB: 1970  MRN: [id number]    Date of Procedure: 3/13/2024        Neurointerventional POST Procedure Note    This is a brief post operative note that serves as immediate documentation to communicate with other clinicians and update them about the procedure  For Billing and complete documentation purposes the COMPLETE operative report/diagnostic imaging report is placed in PACS. To review this document please go under the images tab and click on the relevant IR procedure.      Date of Service: 24      Patient Name: Kristine Roy   : 1970  Medical record number:  [id number]        Procedure: Basilar aneurysm embolization   Physician: VIRIDIANA ZAMUDIO MD  Assistant: Vivi Presley RTR, Enrique Koch RTR  Access:Right Femoral   Vessels injected:   Left vertebral artery   Hemostasis: 8f angioseal  Anesthesia: Please see flowchart  Specimens: None  Blood loss: 45 ml   Contrast Material:  please see dictation in PACS  Fluoro time: Please see dictation in PACS      Diagnosis/Findings:   1. 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 Successful coil embolization of the above mentioned aneurysm RROC 1 occlusion ( complete occlusion )    Plan:  1. Q1h neurochecks while awake, while asleep q4h neurochecks, this is to avoid delirum in a post op elective neuro patient  2. SBP <150, PRN meds ordered  3. Dexa for neurological headache, Headache + neuro sympt/visual/nausea  4. Regular Diet  5. Nicotine patch  6. Groin flat for 2 hours          Implants:  Implant Name Type Inv. Item Serial No.  Lot No. LRB No. Used Action   COIL EMB L10CM DIA5MM 360DEG SFT DETACH [employer] - [id number] Vascular coils COIL EMB L10CM DIA5MM 360DEG SFT DETACH [employer]  [employer]- [id number] N/A 1 Implanted   COIL EMB DETACHABLE 4.5 MMX10 CM STRL [employer] LTX - [id number] Vascular coils COIL EMB  DETACHABLE 4.5 MMX10 CM STRL TARGET TETRA LTX  [employer]-WD [id number] N/A 1 Implanted   COIL EMB DETACHABLE 3.5 MMX6 CM STRL [employer]ET[employer] LTX - [id number] Vascular coils COIL EMB DETACHABLE 3.5 MMX6 CM STRL [employer] TET[employer] LTX  [employer]-WD [id number] N/A 1 Implanted   COIL EMB DETACHABLE 2.5 MMX6 CM STRL [employer] LTX - [id number] Vascular coils COIL EMB DETACHABLE 2.5 MMX6 CM STRL TARGET TET[employer] LTX  [employer]-WD [id number] N/A 1 Implanted   COIL EMB DETACHABLE 2 MMX6 CM STRL [employer]ET[employer] LTX - [id number] Vascular coils COIL EMB DETACHABLE 2 MMX6 CM STRL TARGET TET[employer] LTX  [employer]-WD [id number] N/A 1 Implanted   COIL EMB DETACHABLE 2 MMX6 CM STRL [employer] LTX - [id number] Vascular coils COIL EMB DETACHABLE 2 MMX6 CM STRL [employer] LTX  [employer]WD [id number] N/A 1 Implanted                 Electronically signed by Avi Varela MD on 3/13/2024 at 11:04 AM

## 2024-03-14 VITALS
OXYGEN SATURATION: 95 % | HEART RATE: 78 BPM | DIASTOLIC BLOOD PRESSURE: 91 MMHG | SYSTOLIC BLOOD PRESSURE: 146 MMHG | BODY MASS INDEX: 27.82 KG/M2 | RESPIRATION RATE: 29 BRPM | WEIGHT: 177.25 LBS | TEMPERATURE: 98.1 F | HEIGHT: 67 IN

## 2024-03-14 PROBLEM — Z98.890 S/P COIL EMBOLIZATION OF CEREBRAL ANEURYSM: Status: ACTIVE | Noted: 2024-03-14

## 2024-03-14 LAB
ANION GAP SERPL CALCULATED.3IONS-SCNC: 9 MMOL/L (ref 7–16)
BASOPHILS # BLD: 0 K/UL (ref 0–0.2)
BASOPHILS NFR BLD: 0 % (ref 0–2)
BUN SERPL-MCNC: 12 MG/DL (ref 6–20)
CALCIUM SERPL-MCNC: 9.1 MG/DL (ref 8.6–10.2)
CHLORIDE SERPL-SCNC: 95 MMOL/L (ref 98–107)
CO2 SERPL-SCNC: 23 MMOL/L (ref 22–29)
CREAT SERPL-MCNC: 0.7 MG/DL (ref 0.5–1)
EOSINOPHIL # BLD: 0 K/UL (ref 0.05–0.5)
EOSINOPHILS RELATIVE PERCENT: 0 % (ref 0–6)
ERYTHROCYTE [DISTWIDTH] IN BLOOD BY AUTOMATED COUNT: 11.2 % (ref 11.5–15)
GFR SERPL CREATININE-BSD FRML MDRD: >60 ML/MIN/1.73M2
GLUCOSE SERPL-MCNC: 124 MG/DL (ref 74–99)
HCT VFR BLD AUTO: 31.4 % (ref 34–48)
HGB BLD-MCNC: 11 G/DL (ref 11.5–15.5)
IMM GRANULOCYTES # BLD AUTO: 0.03 K/UL (ref 0–0.58)
IMM GRANULOCYTES NFR BLD: 1 % (ref 0–5)
INR PPP: 1
LYMPHOCYTES NFR BLD: 0.61 K/UL (ref 1.5–4)
LYMPHOCYTES RELATIVE PERCENT: 13 % (ref 20–42)
MCH RBC QN AUTO: 32.9 PG (ref 26–35)
MCHC RBC AUTO-ENTMCNC: 35 G/DL (ref 32–34.5)
MCV RBC AUTO: 94 FL (ref 80–99.9)
MONOCYTES NFR BLD: 0.21 K/UL (ref 0.1–0.95)
MONOCYTES NFR BLD: 4 % (ref 2–12)
NEUTROPHILS NFR BLD: 83 % (ref 43–80)
NEUTS SEG NFR BLD: 3.98 K/UL (ref 1.8–7.3)
PLATELET # BLD AUTO: 285 K/UL (ref 130–450)
PMV BLD AUTO: 8.8 FL (ref 7–12)
POTASSIUM SERPL-SCNC: 4.6 MMOL/L (ref 3.5–5)
PROTHROMBIN TIME: 11.2 SEC (ref 9.3–12.4)
RBC # BLD AUTO: 3.34 M/UL (ref 3.5–5.5)
SODIUM SERPL-SCNC: 127 MMOL/L (ref 132–146)
WBC OTHER # BLD: 4.8 K/UL (ref 4.5–11.5)

## 2024-03-14 PROCEDURE — 85025 COMPLETE CBC W/AUTO DIFF WBC: CPT

## 2024-03-14 PROCEDURE — 2580000003 HC RX 258: Performed by: PSYCHIATRY & NEUROLOGY

## 2024-03-14 PROCEDURE — 85610 PROTHROMBIN TIME: CPT

## 2024-03-14 PROCEDURE — 80048 BASIC METABOLIC PNL TOTAL CA: CPT

## 2024-03-14 PROCEDURE — 6370000000 HC RX 637 (ALT 250 FOR IP): Performed by: PSYCHIATRY & NEUROLOGY

## 2024-03-14 PROCEDURE — 99238 HOSP IP/OBS DSCHRG MGMT 30/<: CPT | Performed by: PSYCHIATRY & NEUROLOGY

## 2024-03-14 PROCEDURE — 6360000002 HC RX W HCPCS: Performed by: PSYCHIATRY & NEUROLOGY

## 2024-03-14 RX ORDER — NICOTINE 21 MG/24HR
1 PATCH, TRANSDERMAL 24 HOURS TRANSDERMAL DAILY
Qty: 30 PATCH | Refills: 3 | Status: SHIPPED | OUTPATIENT
Start: 2024-03-15

## 2024-03-14 RX ORDER — BUPROPION HYDROCHLORIDE 100 MG/1
100 TABLET, EXTENDED RELEASE ORAL DAILY
Status: DISCONTINUED | OUTPATIENT
Start: 2024-03-14 | End: 2024-03-14 | Stop reason: HOSPADM

## 2024-03-14 RX ORDER — 0.9 % SODIUM CHLORIDE 0.9 %
500 INTRAVENOUS SOLUTION INTRAVENOUS ONCE
Status: COMPLETED | OUTPATIENT
Start: 2024-03-14 | End: 2024-03-14

## 2024-03-14 RX ORDER — BUPROPION HYDROCHLORIDE 100 MG/1
100 TABLET, EXTENDED RELEASE ORAL DAILY
Qty: 60 TABLET | Refills: 3 | Status: SHIPPED | OUTPATIENT
Start: 2024-03-14

## 2024-03-14 RX ORDER — DEXAMETHASONE 4 MG/1
4 TABLET ORAL 2 TIMES DAILY WITH MEALS
Qty: 4 TABLET | Refills: 0 | Status: SHIPPED | OUTPATIENT
Start: 2024-03-14 | End: 2024-03-16

## 2024-03-14 RX ORDER — FLUCONAZOLE 150 MG/1
150 TABLET ORAL
Qty: 2 TABLET | Refills: 0 | Status: SHIPPED | OUTPATIENT
Start: 2024-03-14 | End: 2024-03-20

## 2024-03-14 RX ADMIN — FOLIC ACID 1000 MCG: 1 TABLET ORAL at 07:30

## 2024-03-14 RX ADMIN — ASPIRIN 81 MG: 81 TABLET, CHEWABLE ORAL at 07:30

## 2024-03-14 RX ADMIN — ACETAMINOPHEN 650 MG: 325 TABLET ORAL at 08:52

## 2024-03-14 RX ADMIN — ACETAMINOPHEN 650 MG: 325 TABLET ORAL at 04:52

## 2024-03-14 RX ADMIN — HYDROCHLOROTHIAZIDE 12.5 MG: 25 TABLET ORAL at 07:30

## 2024-03-14 RX ADMIN — ENOXAPARIN SODIUM 40 MG: 100 INJECTION SUBCUTANEOUS at 07:30

## 2024-03-14 RX ADMIN — BUPROPION HYDROCHLORIDE 100 MG: 100 TABLET, EXTENDED RELEASE ORAL at 09:14

## 2024-03-14 RX ADMIN — SODIUM CHLORIDE 500 ML: 9 INJECTION, SOLUTION INTRAVENOUS at 09:14

## 2024-03-14 RX ADMIN — LISINOPRIL 20 MG: 20 TABLET ORAL at 07:30

## 2024-03-14 RX ADMIN — DEXAMETHASONE SODIUM PHOSPHATE 4 MG: 10 INJECTION INTRAMUSCULAR; INTRAVENOUS at 04:50

## 2024-03-14 RX ADMIN — MELOXICAM 15 MG: 7.5 TABLET ORAL at 07:32

## 2024-03-14 ASSESSMENT — PAIN DESCRIPTION - PAIN TYPE: TYPE: ACUTE PAIN

## 2024-03-14 ASSESSMENT — PAIN SCALES - GENERAL
PAINLEVEL_OUTOF10: 4
PAINLEVEL_OUTOF10: 6
PAINLEVEL_OUTOF10: 7

## 2024-03-14 ASSESSMENT — PAIN DESCRIPTION - FREQUENCY: FREQUENCY: INTERMITTENT

## 2024-03-14 ASSESSMENT — PAIN DESCRIPTION - LOCATION
LOCATION: HEAD
LOCATION: HEAD

## 2024-03-14 ASSESSMENT — PAIN DESCRIPTION - DESCRIPTORS: DESCRIPTORS: ACHING;POUNDING;SHARP

## 2024-03-14 ASSESSMENT — PAIN DESCRIPTION - ONSET: ONSET: ON-GOING

## 2024-03-14 ASSESSMENT — PAIN DESCRIPTION - ORIENTATION
ORIENTATION: POSTERIOR
ORIENTATION: POSTERIOR

## 2024-03-14 ASSESSMENT — PAIN - FUNCTIONAL ASSESSMENT: PAIN_FUNCTIONAL_ASSESSMENT: ACTIVITIES ARE NOT PREVENTED

## 2024-03-14 NOTE — PLAN OF CARE
Problem: Discharge Planning  Goal: Discharge to home or other facility with appropriate resources  3/13/2024 2151 by Mendy Raza, RN  Outcome: Progressing  Flowsheets (Taken 3/13/2024 2000)  Discharge to home or other facility with appropriate resources: Identify barriers to discharge with patient and caregiver     Problem: ABCDS Injury Assessment  Goal: Absence of physical injury  3/13/2024 2151 by Mendy Raza, RN  Outcome: Progressing  Flowsheets (Taken 3/13/2024 2000)  Absence of Physical Injury: Implement safety measures based on patient assessment     Problem: Safety - Adult  Goal: Free from fall injury  3/13/2024 2151 by Mendy Raza, RN  Outcome: Progressing  Flowsheets (Taken 3/13/2024 2000)  Free From Fall Injury: Instruct family/caregiver on patient safety      Spoke to pt gave info has appt with Dr. José Luis Arreola on 12/21. She stated she just found that the CT she had last month her insurance did not pay for either and she just found that out when she got her EOB in mail. She just wanted MD to know.

## 2024-03-14 NOTE — PLAN OF CARE
Problem: Discharge Planning  Goal: Discharge to home or other facility with appropriate resources  3/14/2024 0919 by Tim Sifuentes RN  Outcome: Completed  3/13/2024 2151 by Mendy Raza RN  Outcome: Progressing  Flowsheets (Taken 3/13/2024 2000)  Discharge to home or other facility with appropriate resources: Identify barriers to discharge with patient and caregiver     Problem: ABCDS Injury Assessment  Goal: Absence of physical injury  3/14/2024 0919 by Tim Sifuentes RN  Outcome: Completed  3/13/2024 2151 by Mendy Raza RN  Outcome: Progressing  Flowsheets (Taken 3/13/2024 2000)  Absence of Physical Injury: Implement safety measures based on patient assessment     Problem: Safety - Adult  Goal: Free from fall injury  3/14/2024 0919 by Tim Sifuentes RN  Outcome: Completed  3/13/2024 2151 by Mendy Raza RN  Outcome: Progressing  Flowsheets (Taken 3/13/2024 2000)  Free From Fall Injury: Instruct family/caregiver on patient safety     Problem: Pain  Goal: Verbalizes/displays adequate comfort level or baseline comfort level  3/14/2024 0919 by Tim Sifuentes RN  Outcome: Completed  3/13/2024 2151 by Mendy Raza RN  Outcome: Progressing  Flowsheets (Taken 3/13/2024 2000)  Verbalizes/displays adequate comfort level or baseline comfort level: Encourage patient to monitor pain and request assistance

## 2024-03-14 NOTE — ACP (ADVANCE CARE PLANNING)
Advance Care Planning   Healthcare Decision Maker:    Primary Decision Maker: Pablito Covarrubias - Child - [phone]    Secondary Decision Maker: Eliane Gonzalez - Brother/Sister - [phone]    Click here to complete Healthcare Decision Makers including selection of the Healthcare Decision Maker Relationship (ie \"Primary\").

## 2024-03-18 NOTE — DISCHARGE SUMMARY
[facility]  Department of Neuroendovascular surgery  Discharge Summary      Kristine Roy  [id number]  3/18/2024  54 y.o. female    Date of Admission:  3/13/2024 10:51 AM      Date of Discharge: 3/14    Discharge Diagnosis:    Cerebral aneurysm, nonruptured [I67.1]  Cerebral aneurysm without rupture [I67.1]      1.  2.   3.   4.  Attending: Avi Varela MD        Consults:Anesthesia  Procedures:succesfful embolization of the basilar aneursym    Hospital Course:  This is a patient who had an ear infection and CT showed Large complex basilar anerusym, had an elective embolization appt and it went smooth and uncomplicated, Did well and had no issues except for a mild headache, wants to go home today and wants to go to work on Monday . BP is well controlled as inpatient  No complaints on day of d/c      Discharge Condition: stable    Discharge Exam  Vitals:    03/14/24 0900   BP: (!) 146/91   Pulse: 78   Resp: 29   Temp:    SpO2: 95%     Mental Status:     Alert and oriented to person, place, and time.  Recent and remote memory: Intact  Attention and concentration: Intact  Speech and language : Intact  Fund of knowledge: Intact  Judgement and Insight: Intact     Cranial Nerves:      II: Visual Fields: Full to confrontation bilaterally   III, IV, VI: Pupils: equally round and reactive to light, 3  to 2  mm bilaterally.                  EOMs: full with no nystagmus.   V: Sensation intact to light touch and pin prick sensation bilaterally  VII: symmetric and strong in the upper and lower face bilaterally  VIII: Hearing intact to finger rub bilaterally   IX,X: Palate elevates symmetrically.   XI: head turn (sternocleidomastoid) and shoulder shrug (trapezius) 5/5 bilaterally   XII: Tongue is midline upon protrusion     Motor:   Normal tone and bulk.   Normal fine finger movements.  No pronator drift.   No resting tremors, postural tremors or myoclonus.     Upper Extremity          Right    Left

## 2024-11-05 ENCOUNTER — OFFICE VISIT (OUTPATIENT)
Dept: VASCULAR SURGERY | Age: 54
End: 2024-11-05
Payer: MEDICAID

## 2024-11-05 VITALS — WEIGHT: 179 LBS | BODY MASS INDEX: 28.03 KG/M2

## 2024-11-05 DIAGNOSIS — I78.1 SPIDER TELANGIECTASIA: Primary | ICD-10-CM

## 2024-11-05 PROCEDURE — G8427 DOCREV CUR MEDS BY ELIG CLIN: HCPCS | Performed by: SURGERY

## 2024-11-05 PROCEDURE — 3017F COLORECTAL CA SCREEN DOC REV: CPT | Performed by: SURGERY

## 2024-11-05 PROCEDURE — 99203 OFFICE O/P NEW LOW 30 MIN: CPT | Performed by: SURGERY

## 2024-11-05 PROCEDURE — G8484 FLU IMMUNIZE NO ADMIN: HCPCS | Performed by: SURGERY

## 2024-11-05 PROCEDURE — G8419 CALC BMI OUT NRM PARAM NOF/U: HCPCS | Performed by: SURGERY

## 2024-11-05 PROCEDURE — 4004F PT TOBACCO SCREEN RCVD TLK: CPT | Performed by: SURGERY

## 2024-11-05 RX ORDER — AMLODIPINE BESYLATE 5 MG/1
5 TABLET ORAL DAILY
COMMUNITY

## 2024-11-05 RX ORDER — MONTELUKAST SODIUM 4 MG/1
4 TABLET, CHEWABLE ORAL NIGHTLY
COMMUNITY

## 2024-11-05 RX ORDER — IBUPROFEN 800 MG/1
800 TABLET, FILM COATED ORAL EVERY 6 HOURS PRN
COMMUNITY

## 2024-11-05 NOTE — PROGRESS NOTES
Vascular Surgery Outpatient Consultation      Chief Complaint   Patient presents with    Consultation     New pt. Rt. Lower extremities pain       Reason for Consult: Varicose veins with pain    Requesting Physician:  Dr. Lopez    HISTORY OF PRESENT ILLNESS:                The patient is a 54 y.o. female who is referred for evaluation of varicose veins.  The patient states a long history of varicose veins with pain.  She denies any leg swelling or ulcerations.  She states that the visual veins appear worse.    Past Medical History:        Diagnosis Date    Cervical radiculopathy     Headache(784.0)     Leg pain     Meningitis     Mononucleosis     Seizure disorder (HCC)     Seizures (HCC)      Past Surgical History:        Procedure Laterality Date    CARPAL TUNNEL RELEASE      bilateral sept 2020    DILATION AND CURETTAGE OF UTERUS      ENDOMETRIAL ABLATION      IR TRANSCATH EMBOLIZATION NON CNS  3/13/2024    IR TRANSCATH EMBOLIZATION NON CNS 3/13/2024 [facility]     Current Medications:   Prior to Admission medications    Medication Sig Start Date End Date Taking? Authorizing Provider   ibuprofen (ADVIL;MOTRIN) 800 MG tablet Take 1 tablet by mouth every 6 hours as needed for Pain   Yes ProviderPatience MD   montelukast (SINGULAIR) 4 MG chewable tablet Take 1 tablet by mouth nightly   Yes ProviderPatience MD   amLODIPine (NORVASC) 5 MG tablet Take 1 tablet by mouth daily   Yes Provider, MD Patience   folic acid (FOLVITE) 1 MG tablet Take 1 tablet by mouth daily   Yes ProviderPatience MD   phentermine 15 MG capsule Take 1 capsule by mouth every morning.   Yes Provider, MD Patience   traZODone (DESYREL) 50 MG tablet take 1/2 to 1 tablet by mouth at bedtime 3/19/24   ProviderPatience MD   lisinopril (PRINIVIL;ZESTRIL) 40 MG tablet Take 1 tablet by mouth daily  Patient not taking: Reported on 11/5/2024 3/19/24   ProviderPatience MD   nicotine (NICODERM CQ) 14 MG/24HR Place 1

## 2025-04-24 ENCOUNTER — HOSPITAL ENCOUNTER (OUTPATIENT)
Age: 55
Discharge: HOME | End: 2025-04-24
Payer: MEDICAID

## 2025-04-24 DIAGNOSIS — Z98.890 S/P COIL EMBOLIZATION OF CEREBRAL ANEURYSM: ICD-10-CM

## 2025-04-24 DIAGNOSIS — I67.1 CEREBRAL ANEURYSM: ICD-10-CM

## 2025-04-24 DIAGNOSIS — F17.211 CIGARETTE NICOTINE DEPENDENCE IN REMISSION: ICD-10-CM

## 2025-04-24 LAB
ANION GAP SERPL CALCULATED.3IONS-SCNC: 12 MMOL/L (ref 7–16)
BUN SERPL-MCNC: 10 MG/DL (ref 6–20)
CALCIUM SERPL-MCNC: 9.6 MG/DL (ref 8.6–10.2)
CHLORIDE SERPL-SCNC: 96 MMOL/L (ref 98–107)
CO2 SERPL-SCNC: 24 MMOL/L (ref 22–29)
CREAT SERPL-MCNC: 0.6 MG/DL (ref 0.5–1)
GFR, ESTIMATED: >90 ML/MIN/1.73M2
GLUCOSE SERPL-MCNC: 83 MG/DL (ref 74–99)
POTASSIUM SERPL-SCNC: 4.6 MMOL/L (ref 3.5–5)
SODIUM SERPL-SCNC: 132 MMOL/L (ref 132–146)

## 2025-04-24 PROCEDURE — 36415 COLL VENOUS BLD VENIPUNCTURE: CPT

## 2025-04-24 PROCEDURE — 80048 BASIC METABOLIC PNL TOTAL CA: CPT

## 2025-04-25 ENCOUNTER — HOSPITAL ENCOUNTER (OUTPATIENT)
Dept: CT IMAGING | Age: 55
Discharge: HOME | End: 2025-04-27
Attending: PSYCHIATRY & NEUROLOGY
Payer: MEDICAID

## 2025-04-25 DIAGNOSIS — F17.211 CIGARETTE NICOTINE DEPENDENCE IN REMISSION: ICD-10-CM

## 2025-04-25 DIAGNOSIS — I67.1 CEREBRAL ANEURYSM: ICD-10-CM

## 2025-04-25 DIAGNOSIS — Z98.890 S/P COIL EMBOLIZATION OF CEREBRAL ANEURYSM: ICD-10-CM

## 2025-04-25 PROCEDURE — 2500000003 HC RX 250 WO HCPCS: Performed by: RADIOLOGY

## 2025-04-25 PROCEDURE — 70496 CT ANGIOGRAPHY HEAD: CPT

## 2025-04-25 PROCEDURE — 6360000004 HC RX CONTRAST MEDICATION: Performed by: RADIOLOGY

## 2025-04-25 RX ORDER — SODIUM CHLORIDE 0.9 % (FLUSH) 0.9 %
10 SYRINGE (ML) INJECTION PRN
Status: DISCONTINUED | OUTPATIENT
Start: 2025-04-25 | End: 2025-04-28 | Stop reason: HOSPADM

## 2025-04-25 RX ORDER — IOPAMIDOL 755 MG/ML
75 INJECTION, SOLUTION INTRAVASCULAR
Status: COMPLETED | OUTPATIENT
Start: 2025-04-25 | End: 2025-04-25

## 2025-04-25 RX ADMIN — SODIUM CHLORIDE, PRESERVATIVE FREE 10 ML: 5 INJECTION INTRAVENOUS at 13:00

## 2025-04-25 RX ADMIN — IOPAMIDOL 75 ML: 755 INJECTION, SOLUTION INTRAVENOUS at 13:00
